# Patient Record
Sex: MALE | URBAN - METROPOLITAN AREA
[De-identification: names, ages, dates, MRNs, and addresses within clinical notes are randomized per-mention and may not be internally consistent; named-entity substitution may affect disease eponyms.]

---

## 2019-06-03 ENCOUNTER — APPOINTMENT (RX ONLY)
Dept: URBAN - METROPOLITAN AREA CLINIC 47 | Facility: CLINIC | Age: 59
Setting detail: DERMATOLOGY
End: 2019-06-03

## 2019-06-03 DIAGNOSIS — L29.89 OTHER PRURITUS: ICD-10-CM

## 2019-06-03 DIAGNOSIS — L85.3 XEROSIS CUTIS: ICD-10-CM

## 2019-06-03 DIAGNOSIS — L29.8 OTHER PRURITUS: ICD-10-CM

## 2019-06-03 PROBLEM — I82.409 ACUTE EMBOLISM AND THROMBOSIS OF UNSPECIFIED DEEP VEINS OF UNSPECIFIED LOWER EXTREMITY: Status: ACTIVE | Noted: 2019-06-03

## 2019-06-03 PROBLEM — H54.7 UNSPECIFIED VISUAL LOSS: Status: ACTIVE | Noted: 2019-06-03

## 2019-06-03 PROBLEM — M12.9 ARTHROPATHY, UNSPECIFIED: Status: ACTIVE | Noted: 2019-06-03

## 2019-06-03 PROBLEM — J44.9 CHRONIC OBSTRUCTIVE PULMONARY DISEASE, UNSPECIFIED: Status: ACTIVE | Noted: 2019-06-03

## 2019-06-03 PROBLEM — K21.9 GASTRO-ESOPHAGEAL REFLUX DISEASE WITHOUT ESOPHAGITIS: Status: ACTIVE | Noted: 2019-06-03

## 2019-06-03 PROBLEM — L20.84 INTRINSIC (ALLERGIC) ECZEMA: Status: ACTIVE | Noted: 2019-06-03

## 2019-06-03 PROBLEM — J30.1 ALLERGIC RHINITIS DUE TO POLLEN: Status: ACTIVE | Noted: 2019-06-03

## 2019-06-03 PROBLEM — I25.10 ATHEROSCLEROTIC HEART DISEASE OF NATIVE CORONARY ARTERY WITHOUT ANGINA PECTORIS: Status: ACTIVE | Noted: 2019-06-03

## 2019-06-03 PROBLEM — I10 ESSENTIAL (PRIMARY) HYPERTENSION: Status: ACTIVE | Noted: 2019-06-03

## 2019-06-03 PROBLEM — D68.8 OTHER SPECIFIED COAGULATION DEFECTS: Status: ACTIVE | Noted: 2019-06-03

## 2019-06-03 PROBLEM — L70.0 ACNE VULGARIS: Status: ACTIVE | Noted: 2019-06-03

## 2019-06-03 PROBLEM — J45.909 UNSPECIFIED ASTHMA, UNCOMPLICATED: Status: ACTIVE | Noted: 2019-06-03

## 2019-06-03 PROBLEM — E78.5 HYPERLIPIDEMIA, UNSPECIFIED: Status: ACTIVE | Noted: 2019-06-03

## 2019-06-03 PROBLEM — E13.9 OTHER SPECIFIED DIABETES MELLITUS WITHOUT COMPLICATIONS: Status: ACTIVE | Noted: 2019-06-03

## 2019-06-03 PROCEDURE — ? ADDITIONAL NOTES

## 2019-06-03 PROCEDURE — 99202 OFFICE O/P NEW SF 15 MIN: CPT

## 2019-06-03 PROCEDURE — ? PRESCRIPTION

## 2019-06-03 PROCEDURE — ? TREATMENT REGIMEN

## 2019-06-03 PROCEDURE — ? COUNSELING

## 2019-06-03 PROCEDURE — ? INVENTORY

## 2019-06-03 RX ORDER — AMMONIUM LACTATE 12 %
LOTION (GRAM) TOPICAL
Qty: 1 | Refills: 1 | Status: CANCELLED
Stop reason: CLARIF

## 2019-06-03 ASSESSMENT — LOCATION DETAILED DESCRIPTION DERM
LOCATION DETAILED: SUPERIOR THORACIC SPINE
LOCATION DETAILED: RIGHT MEDIAL UPPER BACK

## 2019-06-03 ASSESSMENT — LOCATION SIMPLE DESCRIPTION DERM
LOCATION SIMPLE: RIGHT UPPER BACK
LOCATION SIMPLE: UPPER BACK

## 2019-06-03 ASSESSMENT — LOCATION ZONE DERM: LOCATION ZONE: TRUNK

## 2019-06-03 NOTE — HPI: SKIN LESIONS
How Severe Is Your Skin Lesion?: mild
Is This A New Presentation, Or A Follow-Up?: Skin Lesions
Additional History: Treated with antibiotics.

## 2022-08-13 ENCOUNTER — INPATIENT (INPATIENT)
Facility: HOSPITAL | Age: 62
LOS: 1 days | Discharge: HOME | End: 2022-08-15
Attending: INTERNAL MEDICINE | Admitting: INTERNAL MEDICINE

## 2022-08-13 VITALS
HEART RATE: 59 BPM | OXYGEN SATURATION: 100 % | TEMPERATURE: 98 F | RESPIRATION RATE: 18 BRPM | DIASTOLIC BLOOD PRESSURE: 61 MMHG | SYSTOLIC BLOOD PRESSURE: 117 MMHG

## 2022-08-13 DIAGNOSIS — I20.9 ANGINA PECTORIS, UNSPECIFIED: ICD-10-CM

## 2022-08-13 DIAGNOSIS — Z95.1 PRESENCE OF AORTOCORONARY BYPASS GRAFT: Chronic | ICD-10-CM

## 2022-08-13 DIAGNOSIS — G43.909 MIGRAINE, UNSPECIFIED, NOT INTRACTABLE, WITHOUT STATUS MIGRAINOSUS: ICD-10-CM

## 2022-08-13 DIAGNOSIS — Z29.9 ENCOUNTER FOR PROPHYLACTIC MEASURES, UNSPECIFIED: ICD-10-CM

## 2022-08-13 DIAGNOSIS — E87.5 HYPERKALEMIA: ICD-10-CM

## 2022-08-13 DIAGNOSIS — N17.9 ACUTE KIDNEY FAILURE, UNSPECIFIED: ICD-10-CM

## 2022-08-13 DIAGNOSIS — R00.1 BRADYCARDIA, UNSPECIFIED: ICD-10-CM

## 2022-08-13 DIAGNOSIS — Z95.5 PRESENCE OF CORONARY ANGIOPLASTY IMPLANT AND GRAFT: Chronic | ICD-10-CM

## 2022-08-13 DIAGNOSIS — E11.9 TYPE 2 DIABETES MELLITUS WITHOUT COMPLICATIONS: ICD-10-CM

## 2022-08-13 DIAGNOSIS — R07.9 CHEST PAIN, UNSPECIFIED: ICD-10-CM

## 2022-08-13 LAB
ALBUMIN SERPL ELPH-MCNC: 4 G/DL — SIGNIFICANT CHANGE UP (ref 3.3–5)
ALP SERPL-CCNC: 49 U/L — SIGNIFICANT CHANGE UP (ref 40–120)
ALT FLD-CCNC: 9 U/L — SIGNIFICANT CHANGE UP (ref 4–41)
ANION GAP SERPL CALC-SCNC: 10 MMOL/L — SIGNIFICANT CHANGE UP (ref 7–14)
ANION GAP SERPL CALC-SCNC: 11 MMOL/L — SIGNIFICANT CHANGE UP (ref 7–14)
AST SERPL-CCNC: 11 U/L — SIGNIFICANT CHANGE UP (ref 4–40)
B PERT DNA SPEC QL NAA+PROBE: SIGNIFICANT CHANGE UP
B PERT+PARAPERT DNA PNL SPEC NAA+PROBE: SIGNIFICANT CHANGE UP
BASE EXCESS BLDV CALC-SCNC: -1.3 MMOL/L — SIGNIFICANT CHANGE UP (ref -2–3)
BILIRUB SERPL-MCNC: 0.5 MG/DL — SIGNIFICANT CHANGE UP (ref 0.2–1.2)
BLOOD GAS VENOUS COMPREHENSIVE RESULT: SIGNIFICANT CHANGE UP
BORDETELLA PARAPERTUSSIS (RAPRVP): SIGNIFICANT CHANGE UP
BUN SERPL-MCNC: 21 MG/DL — SIGNIFICANT CHANGE UP (ref 7–23)
BUN SERPL-MCNC: 22 MG/DL — SIGNIFICANT CHANGE UP (ref 7–23)
C PNEUM DNA SPEC QL NAA+PROBE: SIGNIFICANT CHANGE UP
CALCIUM SERPL-MCNC: 9 MG/DL — SIGNIFICANT CHANGE UP (ref 8.4–10.5)
CALCIUM SERPL-MCNC: 9.1 MG/DL — SIGNIFICANT CHANGE UP (ref 8.4–10.5)
CHLORIDE BLDV-SCNC: 105 MMOL/L — SIGNIFICANT CHANGE UP (ref 96–108)
CHLORIDE SERPL-SCNC: 104 MMOL/L — SIGNIFICANT CHANGE UP (ref 98–107)
CHLORIDE SERPL-SCNC: 105 MMOL/L — SIGNIFICANT CHANGE UP (ref 98–107)
CO2 BLDV-SCNC: 26.7 MMOL/L — HIGH (ref 22–26)
CO2 SERPL-SCNC: 22 MMOL/L — SIGNIFICANT CHANGE UP (ref 22–31)
CO2 SERPL-SCNC: 23 MMOL/L — SIGNIFICANT CHANGE UP (ref 22–31)
CREAT SERPL-MCNC: 1.29 MG/DL — SIGNIFICANT CHANGE UP (ref 0.5–1.3)
CREAT SERPL-MCNC: 1.58 MG/DL — HIGH (ref 0.5–1.3)
CREAT SERPL-MCNC: 1.61 MG/DL — HIGH (ref 0.5–1.3)
EGFR: 49 ML/MIN/1.73M2 — LOW
EGFR: 50 ML/MIN/1.73M2 — LOW
EGFR: 64 ML/MIN/1.73M2 — SIGNIFICANT CHANGE UP
FLUAV SUBTYP SPEC NAA+PROBE: SIGNIFICANT CHANGE UP
FLUBV RNA SPEC QL NAA+PROBE: SIGNIFICANT CHANGE UP
GAS PNL BLDV: 135 MMOL/L — LOW (ref 136–145)
GLUCOSE BLDC GLUCOMTR-MCNC: 111 MG/DL — HIGH (ref 70–99)
GLUCOSE BLDC GLUCOMTR-MCNC: 137 MG/DL — HIGH (ref 70–99)
GLUCOSE BLDC GLUCOMTR-MCNC: 86 MG/DL — SIGNIFICANT CHANGE UP (ref 70–99)
GLUCOSE BLDV-MCNC: 106 MG/DL — HIGH (ref 70–99)
GLUCOSE SERPL-MCNC: 114 MG/DL — HIGH (ref 70–99)
GLUCOSE SERPL-MCNC: 76 MG/DL — SIGNIFICANT CHANGE UP (ref 70–99)
HADV DNA SPEC QL NAA+PROBE: SIGNIFICANT CHANGE UP
HCO3 BLDV-SCNC: 25 MMOL/L — SIGNIFICANT CHANGE UP (ref 22–29)
HCOV 229E RNA SPEC QL NAA+PROBE: SIGNIFICANT CHANGE UP
HCOV HKU1 RNA SPEC QL NAA+PROBE: SIGNIFICANT CHANGE UP
HCOV NL63 RNA SPEC QL NAA+PROBE: SIGNIFICANT CHANGE UP
HCOV OC43 RNA SPEC QL NAA+PROBE: SIGNIFICANT CHANGE UP
HCT VFR BLD CALC: 34.8 % — LOW (ref 39–50)
HCT VFR BLDA CALC: 36 % — LOW (ref 39–51)
HGB BLD CALC-MCNC: 12.1 G/DL — LOW (ref 13–17)
HGB BLD-MCNC: 10.9 G/DL — LOW (ref 13–17)
HIV 1+2 AB+HIV1 P24 AG SERPL QL IA: SIGNIFICANT CHANGE UP
HMPV RNA SPEC QL NAA+PROBE: SIGNIFICANT CHANGE UP
HPIV1 RNA SPEC QL NAA+PROBE: SIGNIFICANT CHANGE UP
HPIV2 RNA SPEC QL NAA+PROBE: SIGNIFICANT CHANGE UP
HPIV3 RNA SPEC QL NAA+PROBE: SIGNIFICANT CHANGE UP
HPIV4 RNA SPEC QL NAA+PROBE: SIGNIFICANT CHANGE UP
LACTATE BLDV-MCNC: 1.2 MMOL/L — SIGNIFICANT CHANGE UP (ref 0.5–2)
M PNEUMO DNA SPEC QL NAA+PROBE: SIGNIFICANT CHANGE UP
MAGNESIUM SERPL-MCNC: 2 MG/DL — SIGNIFICANT CHANGE UP (ref 1.6–2.6)
MCHC RBC-ENTMCNC: 28.5 PG — SIGNIFICANT CHANGE UP (ref 27–34)
MCHC RBC-ENTMCNC: 31.3 GM/DL — LOW (ref 32–36)
MCV RBC AUTO: 91.1 FL — SIGNIFICANT CHANGE UP (ref 80–100)
NRBC # BLD: 0 /100 WBCS — SIGNIFICANT CHANGE UP
NRBC # FLD: 0 K/UL — SIGNIFICANT CHANGE UP
NT-PROBNP SERPL-SCNC: 1114 PG/ML — HIGH
PCO2 BLDV: 49 MMHG — SIGNIFICANT CHANGE UP (ref 42–55)
PH BLDV: 7.32 — SIGNIFICANT CHANGE UP (ref 7.32–7.43)
PHOSPHATE SERPL-MCNC: 3.6 MG/DL — SIGNIFICANT CHANGE UP (ref 2.5–4.5)
PLATELET # BLD AUTO: 207 K/UL — SIGNIFICANT CHANGE UP (ref 150–400)
PO2 BLDV: 40 MMHG — SIGNIFICANT CHANGE UP
POTASSIUM BLDV-SCNC: 4.7 MMOL/L — SIGNIFICANT CHANGE UP (ref 3.5–5.1)
POTASSIUM SERPL-MCNC: 4.6 MMOL/L — SIGNIFICANT CHANGE UP (ref 3.5–5.3)
POTASSIUM SERPL-MCNC: 5.6 MMOL/L — HIGH (ref 3.5–5.3)
POTASSIUM SERPL-SCNC: 4.6 MMOL/L — SIGNIFICANT CHANGE UP (ref 3.5–5.3)
POTASSIUM SERPL-SCNC: 5.6 MMOL/L — HIGH (ref 3.5–5.3)
PROT SERPL-MCNC: 6 G/DL — SIGNIFICANT CHANGE UP (ref 6–8.3)
RAPID RVP RESULT: SIGNIFICANT CHANGE UP
RBC # BLD: 3.82 M/UL — LOW (ref 4.2–5.8)
RBC # FLD: 13.9 % — SIGNIFICANT CHANGE UP (ref 10.3–14.5)
RSV RNA SPEC QL NAA+PROBE: SIGNIFICANT CHANGE UP
RV+EV RNA SPEC QL NAA+PROBE: SIGNIFICANT CHANGE UP
SAO2 % BLDV: 61.7 % — SIGNIFICANT CHANGE UP
SARS-COV-2 RNA SPEC QL NAA+PROBE: SIGNIFICANT CHANGE UP
SARS-COV-2 RNA SPEC QL NAA+PROBE: SIGNIFICANT CHANGE UP
SODIUM SERPL-SCNC: 137 MMOL/L — SIGNIFICANT CHANGE UP (ref 135–145)
SODIUM SERPL-SCNC: 138 MMOL/L — SIGNIFICANT CHANGE UP (ref 135–145)
TROPONIN T, HIGH SENSITIVITY RESULT: 14 NG/L — SIGNIFICANT CHANGE UP
TROPONIN T, HIGH SENSITIVITY RESULT: 17 NG/L — SIGNIFICANT CHANGE UP
WBC # BLD: 6.48 K/UL — SIGNIFICANT CHANGE UP (ref 3.8–10.5)
WBC # FLD AUTO: 6.48 K/UL — SIGNIFICANT CHANGE UP (ref 3.8–10.5)

## 2022-08-13 PROCEDURE — 71046 X-RAY EXAM CHEST 2 VIEWS: CPT | Mod: 26

## 2022-08-13 PROCEDURE — 93010 ELECTROCARDIOGRAM REPORT: CPT

## 2022-08-13 PROCEDURE — 70450 CT HEAD/BRAIN W/O DYE: CPT | Mod: 26

## 2022-08-13 PROCEDURE — 99285 EMERGENCY DEPT VISIT HI MDM: CPT | Mod: 25

## 2022-08-13 PROCEDURE — 99223 1ST HOSP IP/OBS HIGH 75: CPT

## 2022-08-13 RX ORDER — SODIUM CHLORIDE 9 MG/ML
1000 INJECTION, SOLUTION INTRAVENOUS
Refills: 0 | Status: DISCONTINUED | OUTPATIENT
Start: 2022-08-13 | End: 2022-08-15

## 2022-08-13 RX ORDER — GLUCAGON INJECTION, SOLUTION 0.5 MG/.1ML
1 INJECTION, SOLUTION SUBCUTANEOUS ONCE
Refills: 0 | Status: DISCONTINUED | OUTPATIENT
Start: 2022-08-13 | End: 2022-08-15

## 2022-08-13 RX ORDER — ASPIRIN/CALCIUM CARB/MAGNESIUM 324 MG
81 TABLET ORAL DAILY
Refills: 0 | Status: DISCONTINUED | OUTPATIENT
Start: 2022-08-13 | End: 2022-08-15

## 2022-08-13 RX ORDER — SODIUM ZIRCONIUM CYCLOSILICATE 10 G/10G
10 POWDER, FOR SUSPENSION ORAL ONCE
Refills: 0 | Status: COMPLETED | OUTPATIENT
Start: 2022-08-13 | End: 2022-08-13

## 2022-08-13 RX ORDER — SODIUM CHLORIDE 9 MG/ML
1000 INJECTION INTRAMUSCULAR; INTRAVENOUS; SUBCUTANEOUS ONCE
Refills: 0 | Status: COMPLETED | OUTPATIENT
Start: 2022-08-13 | End: 2022-08-13

## 2022-08-13 RX ORDER — RANOLAZINE 500 MG/1
500 TABLET, FILM COATED, EXTENDED RELEASE ORAL
Refills: 0 | Status: DISCONTINUED | OUTPATIENT
Start: 2022-08-13 | End: 2022-08-15

## 2022-08-13 RX ORDER — ASPIRIN/CALCIUM CARB/MAGNESIUM 324 MG
1 TABLET ORAL
Qty: 0 | Refills: 0 | DISCHARGE

## 2022-08-13 RX ORDER — PRASUGREL 5 MG/1
1 TABLET, FILM COATED ORAL
Qty: 0 | Refills: 0 | DISCHARGE

## 2022-08-13 RX ORDER — INSULIN LISPRO 100/ML
VIAL (ML) SUBCUTANEOUS
Refills: 0 | Status: DISCONTINUED | OUTPATIENT
Start: 2022-08-13 | End: 2022-08-15

## 2022-08-13 RX ORDER — DEXTROSE 50 % IN WATER 50 %
25 SYRINGE (ML) INTRAVENOUS ONCE
Refills: 0 | Status: DISCONTINUED | OUTPATIENT
Start: 2022-08-13 | End: 2022-08-15

## 2022-08-13 RX ORDER — FAMOTIDINE 10 MG/ML
20 INJECTION INTRAVENOUS DAILY
Refills: 0 | Status: DISCONTINUED | OUTPATIENT
Start: 2022-08-13 | End: 2022-08-15

## 2022-08-13 RX ORDER — HEPARIN SODIUM 5000 [USP'U]/ML
5000 INJECTION INTRAVENOUS; SUBCUTANEOUS EVERY 8 HOURS
Refills: 0 | Status: DISCONTINUED | OUTPATIENT
Start: 2022-08-13 | End: 2022-08-15

## 2022-08-13 RX ORDER — PANTOPRAZOLE SODIUM 20 MG/1
40 TABLET, DELAYED RELEASE ORAL
Refills: 0 | Status: DISCONTINUED | OUTPATIENT
Start: 2022-08-13 | End: 2022-08-13

## 2022-08-13 RX ORDER — PRASUGREL 5 MG/1
10 TABLET, FILM COATED ORAL DAILY
Refills: 0 | Status: DISCONTINUED | OUTPATIENT
Start: 2022-08-13 | End: 2022-08-15

## 2022-08-13 RX ORDER — DEXTROSE 50 % IN WATER 50 %
12.5 SYRINGE (ML) INTRAVENOUS ONCE
Refills: 0 | Status: DISCONTINUED | OUTPATIENT
Start: 2022-08-13 | End: 2022-08-15

## 2022-08-13 RX ORDER — OMEPRAZOLE 10 MG/1
1 CAPSULE, DELAYED RELEASE ORAL
Qty: 0 | Refills: 0 | DISCHARGE

## 2022-08-13 RX ORDER — ISOSORBIDE MONONITRATE 60 MG/1
30 TABLET, EXTENDED RELEASE ORAL DAILY
Refills: 0 | Status: DISCONTINUED | OUTPATIENT
Start: 2022-08-13 | End: 2022-08-15

## 2022-08-13 RX ORDER — INSULIN LISPRO 100/ML
VIAL (ML) SUBCUTANEOUS AT BEDTIME
Refills: 0 | Status: DISCONTINUED | OUTPATIENT
Start: 2022-08-13 | End: 2022-08-15

## 2022-08-13 RX ORDER — DEXTROSE 50 % IN WATER 50 %
15 SYRINGE (ML) INTRAVENOUS ONCE
Refills: 0 | Status: DISCONTINUED | OUTPATIENT
Start: 2022-08-13 | End: 2022-08-15

## 2022-08-13 RX ORDER — ACETAMINOPHEN 500 MG
650 TABLET ORAL EVERY 6 HOURS
Refills: 0 | Status: DISCONTINUED | OUTPATIENT
Start: 2022-08-13 | End: 2022-08-15

## 2022-08-13 RX ADMIN — FAMOTIDINE 20 MILLIGRAM(S): 10 INJECTION INTRAVENOUS at 17:18

## 2022-08-13 RX ADMIN — RANOLAZINE 500 MILLIGRAM(S): 500 TABLET, FILM COATED, EXTENDED RELEASE ORAL at 17:36

## 2022-08-13 RX ADMIN — HEPARIN SODIUM 5000 UNIT(S): 5000 INJECTION INTRAVENOUS; SUBCUTANEOUS at 23:40

## 2022-08-13 RX ADMIN — SODIUM CHLORIDE 1000 MILLILITER(S): 9 INJECTION INTRAMUSCULAR; INTRAVENOUS; SUBCUTANEOUS at 08:01

## 2022-08-13 RX ADMIN — SODIUM ZIRCONIUM CYCLOSILICATE 10 GRAM(S): 10 POWDER, FOR SUSPENSION ORAL at 15:24

## 2022-08-13 NOTE — ED ADULT NURSE REASSESSMENT NOTE - NS ED NURSE REASSESS COMMENT FT1
Received patient in bed AOX4. Patient came this morning for intermittent chest pain for the past 4-5 days.  Currently denied any chest pain except for headaches and dizziness if standing up.  Stated that he had a cardiac bypass and 5 stents we placed. Also taking blood thinner and blood pressure medications.  Patient is admitted and awaiting for bed.

## 2022-08-13 NOTE — ED PROVIDER NOTE - NSICDXPASTSURGICALHX_GEN_ALL_CORE_FT
PAST SURGICAL HISTORY:  H/O heart artery stent     S/P CABG (coronary artery bypass graft)     S/P CABG x 5

## 2022-08-13 NOTE — ED PROVIDER NOTE - NS ED ATTENDING STATEMENT MOD
This was a shared visit with the NATTY. I reviewed and verified the documentation and independently performed the documented:

## 2022-08-13 NOTE — H&P ADULT - PROBLEM SELECTOR PLAN 6
DVT ppx: heparin subq  Diet: DASH/consistent carb on home ertugliflozin-metformin. Was prescribed semiglutide but didn't take it   -hold home po DM med while inpatient  -start insulin correctional scale and FS TIDAC for now

## 2022-08-13 NOTE — H&P ADULT - NSHPREVIEWOFSYSTEMS_GEN_ALL_CORE
REVIEW OF SYSTEMS:    CONSTITUTIONAL: No weakness, fevers or chills  EYES: No visual change  ENT: No vertigo or throat pain   NECK: No pain or stiffness  RESPIRATORY: No cough, wheezing, hemoptysis; No shortness of breath  CARDIOVASCULAR: No chest pain or palpitations  GASTROINTESTINAL: No abdominal or epigastric pain. No nausea, vomiting, or hematemesis; No diarrhea or constipation. No melena or hematochezia.  GENITOURINARY: No dysuria, frequency or hematuria  NEUROLOGICAL: No numbness or weakness  SKIN: No itching, rashes  MSK: no joint pain, full ROM  PSYCH: no anxiety no depression CONSTITUTIONAL: No weakness, fevers or chills  EYES: No visual change  ENT: No vertigo or throat pain   NECK: No pain or stiffness  RESPIRATORY: No cough, wheezing, hemoptysis; No shortness of breath  CARDIOVASCULAR: No chest pain or palpitations  GASTROINTESTINAL: No abdominal or epigastric pain. No nausea, vomiting, or hematemesis; No diarrhea or constipation. No melena or hematochezia.  GENITOURINARY: No dysuria, frequency or hematuria  NEUROLOGICAL: No numbness or weakness  SKIN: No itching, rashes  MSK: no joint pain, full ROM  PSYCH: no anxiety no depression CONSTITUTIONAL: No weakness, fevers or chills  EYES: bilateral blurry vision, no flashing light.   ENT: No vertigo or throat pain   NECK: No pain or stiffness  RESPIRATORY: +cough, wheezing, hemoptysis; intermittent shortness of breath  CARDIOVASCULAR: +chest pain, no palpitations  GASTROINTESTINAL: No abdominal or epigastric pain. No nausea, vomiting, or hematemesis; No diarrhea or constipation. No melena or hematochezia.  GENITOURINARY: + dysuria, no frequency or hematuria  NEUROLOGICAL: No numbness or weakness. HA  SKIN: No itching, rashes  MSK: no joint pain, full ROM  PSYCH: no anxiety no depression

## 2022-08-13 NOTE — ED PROVIDER NOTE - PHYSICAL EXAMINATION
CONSTITUTIONAL: Well-appearing; well-nourished; in no apparent distress. Non-toxic appearing.   NEURO: Alert & oriented. Gait steady without assistance. Sensory and motor functions are grossly intact.  PSYCH: Mood appropriate. Thought processes intact.   NECK: Supple  CARD: bradycardia. regular rhythm, no murmurs  RESP: No accessory muscle use; breath sounds clear and equal bilaterally; no wheezes, rhonchi, or rales     ABD: Soft; non-distended; non-tender.   MUSCULOSKELETAL/EXTREMITIES: FROM in all four extremities; no extremity edema.  SKIN: Warm; dry; no apparent lesions or exudate

## 2022-08-13 NOTE — H&P ADULT - PROBLEM SELECTOR PLAN 4
noted for bradycardia on EKG and tele. Possibly 2/2 drug induced given patient recent started on clonidine  -per patient, this is new. No prior hx of bradycardia. EKG doesn't show any heart block or acute ischemia.  -will hold clonidine at this time  -f/u card rec  -tele monitor unclear baseline. Per patient, baseline cr not normal. So unclear if he has ckd and this is his baseline  -reports dysuria for the past week. Will obtain urinalysis  -check urine studies  -continue to monitor  -avoid nephrotoxic meds unclear baseline. Per patient, baseline cr not normal. So unclear if he has ckd and this is his baseline  -reports dysuria for the past week. Will obtain urinalysis  -check urine studies  -continue to monitor  -avoid nephrotoxic meds  -holding home PPI, switch to famotidine for now

## 2022-08-13 NOTE — ED PROVIDER NOTE - CLINICAL SUMMARY MEDICAL DECISION MAKING FREE TEXT BOX
62 yo M here for chest pain and sob. exam found pt bradycardic, and orthostatic positive. a+o x3. lung sound clear b/l. no pitting edema in LE.   orthostatic vs ACS vs CHF/cardiogenic shock.  start fluid.   labs including trop and pro-bnp, cxr.  pt's cardiologist is associated with [facility]. 62 yo M here for chest pain and sob. exam found pt bradycardic, and orthostatic positive. a+o x3. lung sound clear b/l. no pitting edema in LE.   medication induced hypotension and bradycardia vs ACS vs CHF/cardiogenic shock.  start fluid.   labs including trop and pro-bnp, cxr.  pt's cardiologist is associated with [facility]. 60 yo M here for chest pain and sob. exam found pt bradycardic, and orthostatic positive. a+o x3. lung sound clear b/l. no pitting edema in LE.   medication induced hypotension and bradycardia vs ACS vs heart failure/cardiogenic shock.  start fluid.   labs including trop and pro-bnp, cxr.  pt's cardiologist is associated with [facility].

## 2022-08-13 NOTE — H&P ADULT - NSHPSOCIALHISTORY_GEN_ALL_CORE
Denies tobacco, alcohol, drug use. Denies tobacco, alcohol, drug use. Lives alone. Works in a shelter

## 2022-08-13 NOTE — ED PROVIDER NOTE - NS ED ROS FT
ROS:  GENERAL: No fever, no chills  HEENT: no vision changes, no trouble swallowing, no trouble speaking  CARDIAC: see HPI  PULMONARY: no cough, no shortness of breath  GI: no abdominal pain, no nausea, no vomiting, no diarrhea, no constipation  : No dysuria, no frequency, no change in appearance or odor of urine  SKIN: no rashes  NEURO: no headache, no weakness, no dizziness  MSK: No joint pain  All other systems reviewed as negative. As per HPI

## 2022-08-13 NOTE — H&P ADULT - NSHPPHYSICALEXAM_GEN_ALL_CORE
VITALS: T(C): 36.7 (08-13-22 @ 11:00), Max: 36.7 (08-13-22 @ 06:50)  HR: 46 (08-13-22 @ 11:00) (46 - 59)  BP: 124/52 (08-13-22 @ 11:00) (100/51 - 124/52)  RR: 16 (08-13-22 @ 11:00) (16 - 18)  SpO2: 100% (08-13-22 @ 11:00) (100% - 100%)  GENERAL: NAD, well-developed, alert, resting comfortably  HEAD:  Atraumatic, Normocephalic  EYES: EOMI, PERRLA, conjunctiva and sclera clear  NECK: Supple, No JVD  CHEST/LUNG: Clear to auscultation bilaterally; No wheeze, ronchi or rales  HEART: Bradycardic, Regular rhythm; No murmurs, rubs, or gallops  ABDOMEN: Soft, Nontender, Nondistended; Bowel sounds present  EXTREMITIES:  2+ Peripheral Pulses, No clubbing, cyanosis, or edema  PSYCH: AAOx3, normal behavior, normal affect  NEUROLOGY: non-focal, normal strength, normal sensation  SKIN: No rashes or lesions

## 2022-08-13 NOTE — ED PROVIDER NOTE - PROGRESS NOTE DETAILS
pro-BNP 1114, trop 17, cxr showed clear lungs. pt admitted under Manuel Chacon for heart failure. will repeat trop.   K 5.6 but EKG showed no peaked wave. will continue monitor.

## 2022-08-13 NOTE — H&P ADULT - PROBLEM SELECTOR PLAN 3
unclear baseline. Per patient, baseline cr not normal. So unclear if he has ckd and this is his baseline  -reports dysuria for the past week. Will obtain urinalysis  -check urine studies  -continue to monitor  -avoid nephrotoxic meds headache and blurry vision can occur without chest pain as well. Photophobia. Suspect migraine headache.  -no gait abnormality and nonfocal neuro exam  -will obtain CTH to r/o any intracranial pathology  -tylenol prn for pain

## 2022-08-13 NOTE — H&P ADULT - PROBLEM SELECTOR PLAN 2
headache and blurry vision can occur without chest pain as well. Photophobia. Suspect migraine headache.  -no gait abnormality and nonfocal neuro exam  -will obtain CTH to r/o any intracranial pathology  -tylenol prn for pain likely in the setting of elevated Cr  -no peaked T wave on EKG  -will give lokelma x1  -repeat BMP in the early evening

## 2022-08-13 NOTE — H&P ADULT - PROBLEM SELECTOR PLAN 1
worsening pressuring, nonradiating chest pain x1 week with intermittent headache, blurry vision and sob  -EKG noted for sinus bradycardia, no acute ST elevation or T-wave inversion  -trop 14<--17  -c/w home ranolazine and isosorbide mononitrate w/ hold parameter  -f/u card recs  -tele monitoring  -will get echo  -hx of CAD, c/w home asa and prasugrel worsening pressuring, nonradiating chest pain x1 week with intermittent headache, blurry vision and sob  -EKG noted for sinus bradycardia, no acute ST elevation or T-wave inversion  -trop 14<--17  -pro-BNP elevated 1114, no orthopnea, leg swelling, currently not volume overload on exam  -c/w home ranolazine and isosorbide mononitrate w/ hold parameter  -f/u card recs  -tele monitoring  -will get echo  -hx of CAD, c/w home asa and prasugrel

## 2022-08-13 NOTE — H&P ADULT - ASSESSMENT
60 yo M with DM, CABG, s/p cardiac stents x5 presenting with worsening intermittent chest pain with associated headache, blurry vision and intermittent sob.

## 2022-08-13 NOTE — ED PROVIDER NOTE - OBJECTIVE STATEMENT
60 yo M with DM, CABG, s/p cardiac stents x5, c/o intermittent chest pain that is getting worse and more constant in the last 4-5 days. since this morning pt has been feeling dizzy while standing. took nitroglycerin, isosorbide, pt also states that there is sob sometimes. worse with walking and going up stairs. denies fever, chills, NVD. 62 yo M with DM, CABG, s/p cardiac stents x5, c/o intermittent chest pain that is getting worse and more constant in the last 4-5 days. since this morning pt has been feeling dizzy while standing. took nitroglycerin, isosorbide, pt also states that there is sob sometimes. worse with walking and going up stairs. denies fever, chills, NVD.  upon further interview, pt states that he was on metoprolol 25mg daily and last week his PCP Dr. Frias changed it into clonidine 0.1mg. reason unknown.

## 2022-08-13 NOTE — H&P ADULT - PROBLEM SELECTOR PLAN 5
on home ertugliflozin-metformin. Was prescribed semiglutide but didn't take it   -hold home po DM med while inpatient  -start insulin correctional scale and FS TIDAC for now noted for bradycardia on EKG and tele. Possibly 2/2 drug induced given patient recent started on clonidine  -per patient, this is new. No prior hx of bradycardia. EKG doesn't show any heart block or acute ischemia.  -will hold clonidine at this time  -f/u card rec  -tele monitor

## 2022-08-13 NOTE — ED ADULT NURSE NOTE - OBJECTIVE STATEMENT
Pt received to RM 29 AxOx4 complaining of Chest pain and SOB/ dyspnea on exertion worsening over the past few days. Chest pain is midsternal radiating up the back of neck. Placed on 2LNC for comfort, O2 sat was 100% RA. History of triple bypass in 2019, diabetes mellitus type 2, and hypertension. Denies nausea, vomiting, diarrhea,  symptoms. Orthostatics completed, positive.

## 2022-08-13 NOTE — H&P ADULT - HISTORY OF PRESENT ILLNESS
62 yo M with DM, CABG, s/p cardiac stents x5 presenting with worsening intermittent chest pain with associated headache and blurry vision (and sometimes sob) for the past week. Reports the pain in the chest is in the middle, pressure in nature, nonradiating. The episode often lasts about 5-10 mins. Taking his bp meds often helps the episode to go away. He checked his BP during the episode and SBP was in 150s. Reports getting up and walking around seem to trigger these episodes though they can happen when he is sitting down resting as well. Reports productive cough and dysuria for the past week. Denies sick contact, fever, chills, n/v/d. Of note, patient's pcp recently changed his medications recently (was on metoprolol 25mg daily and PCP changed it to clonidine 0.1mg) because of these episodes. He also was prescribed nitroglycerin and took it for the first time yesterday but feels that it didn't help his symptom. 60 yo M with DM, CABG, s/p cardiac stents x5 presenting with worsening intermittent chest pain with associated headache and blurry vision (and sometimes sob) for the past week. Reports the pain in the chest is in the middle, pressure in nature, nonradiating, nonreproducible on palpation. The episode often lasts about 5-10 mins. Taking his bp meds often helps the episode to go away. He checked his BP during the episode and SBP was in 150s. Reports getting up and walking around seem to trigger these episodes though they can happen when he is sitting down resting as well. The headache is at the back of his head and neck, light makes the headache worse. Reports productive cough and dysuria for the past week. Denies sick contact, fever, chills, n/v/d. Of note, patient's pcp recently changed his medications a few day ago (was on metoprolol 25mg daily and PCP changed it to clonidine 0.1mg) because of these episodes. He also was prescribed nitroglycerin and took it for the first time yesterday but feels that it didn't help his symptom. Denies leg swelling, weight gain, orthopnea.

## 2022-08-13 NOTE — ED PROVIDER NOTE - ATTENDING APP SHARED VISIT CONTRIBUTION OF CARE
I (Alverto) agree with above, I performed a history and physical. Counseled erin medical staff, physician assistant, and/or medical student on medical decision making as documented. Medical decisions and treatment interventions were made in real time during the patient encounter. Additionally and/or with the following exceptions: The patient presented to the ED with chest pain as above, non exertional, has a history of coronary artery bypass graft, and 5 stents, and dm. Of note was also orthostatically hypotensive. neuro intact, no pulse deficit, well perfused. ekg bradycardic but non ischemic, complete blood count within normal limits, complete metabolic panel within normal limits, first troponin indeterminant. Admitted for continued chest pain workup.     I reviewed monitor data at least 2 times 10 minutes or more apart during the patients stay.    The patient's condition was not amenable to outpatient treatment due either the lack of feasibility of outpatient care coordination, possibility for further decompensation with adverse outcome if discharge, or treatments and diagnostic  modalities only available during an inpatient hospitalization.

## 2022-08-14 LAB
A1C WITH ESTIMATED AVERAGE GLUCOSE RESULT: 5.9 % — HIGH (ref 4–5.6)
ANION GAP SERPL CALC-SCNC: 12 MMOL/L — SIGNIFICANT CHANGE UP (ref 7–14)
BUN SERPL-MCNC: 25 MG/DL — HIGH (ref 7–23)
CALCIUM SERPL-MCNC: 9.1 MG/DL — SIGNIFICANT CHANGE UP (ref 8.4–10.5)
CHLORIDE SERPL-SCNC: 102 MMOL/L — SIGNIFICANT CHANGE UP (ref 98–107)
CO2 SERPL-SCNC: 23 MMOL/L — SIGNIFICANT CHANGE UP (ref 22–31)
CREAT SERPL-MCNC: 1.51 MG/DL — HIGH (ref 0.5–1.3)
EGFR: 53 ML/MIN/1.73M2 — LOW
ESTIMATED AVERAGE GLUCOSE: 123 — SIGNIFICANT CHANGE UP
GLUCOSE BLDC GLUCOMTR-MCNC: 120 MG/DL — HIGH (ref 70–99)
GLUCOSE BLDC GLUCOMTR-MCNC: 120 MG/DL — HIGH (ref 70–99)
GLUCOSE BLDC GLUCOMTR-MCNC: 225 MG/DL — HIGH (ref 70–99)
GLUCOSE BLDC GLUCOMTR-MCNC: 232 MG/DL — HIGH (ref 70–99)
GLUCOSE BLDC GLUCOMTR-MCNC: 77 MG/DL — SIGNIFICANT CHANGE UP (ref 70–99)
GLUCOSE SERPL-MCNC: 111 MG/DL — HIGH (ref 70–99)
HCV AB S/CO SERPL IA: 0.08 S/CO — SIGNIFICANT CHANGE UP (ref 0–0.99)
HCV AB SERPL-IMP: SIGNIFICANT CHANGE UP
HIV 1+2 AB+HIV1 P24 AG SERPL QL IA: SIGNIFICANT CHANGE UP
MAGNESIUM SERPL-MCNC: 1.8 MG/DL — SIGNIFICANT CHANGE UP (ref 1.6–2.6)
PHOSPHATE SERPL-MCNC: 4 MG/DL — SIGNIFICANT CHANGE UP (ref 2.5–4.5)
POTASSIUM SERPL-MCNC: 4.6 MMOL/L — SIGNIFICANT CHANGE UP (ref 3.5–5.3)
POTASSIUM SERPL-SCNC: 4.6 MMOL/L — SIGNIFICANT CHANGE UP (ref 3.5–5.3)
SODIUM SERPL-SCNC: 137 MMOL/L — SIGNIFICANT CHANGE UP (ref 135–145)
T4 FREE SERPL-MCNC: 1.1 NG/DL — SIGNIFICANT CHANGE UP (ref 0.9–1.8)
TSH SERPL-MCNC: 1.15 UIU/ML — SIGNIFICANT CHANGE UP (ref 0.27–4.2)

## 2022-08-14 RX ADMIN — Medication 81 MILLIGRAM(S): at 12:03

## 2022-08-14 RX ADMIN — HEPARIN SODIUM 5000 UNIT(S): 5000 INJECTION INTRAVENOUS; SUBCUTANEOUS at 22:03

## 2022-08-14 RX ADMIN — ISOSORBIDE MONONITRATE 30 MILLIGRAM(S): 60 TABLET, EXTENDED RELEASE ORAL at 12:03

## 2022-08-14 RX ADMIN — RANOLAZINE 500 MILLIGRAM(S): 500 TABLET, FILM COATED, EXTENDED RELEASE ORAL at 17:55

## 2022-08-14 RX ADMIN — PRASUGREL 10 MILLIGRAM(S): 5 TABLET, FILM COATED ORAL at 12:03

## 2022-08-14 RX ADMIN — RANOLAZINE 500 MILLIGRAM(S): 500 TABLET, FILM COATED, EXTENDED RELEASE ORAL at 05:25

## 2022-08-14 RX ADMIN — FAMOTIDINE 20 MILLIGRAM(S): 10 INJECTION INTRAVENOUS at 12:03

## 2022-08-14 RX ADMIN — HEPARIN SODIUM 5000 UNIT(S): 5000 INJECTION INTRAVENOUS; SUBCUTANEOUS at 14:25

## 2022-08-14 RX ADMIN — Medication 2: at 08:44

## 2022-08-14 RX ADMIN — HEPARIN SODIUM 5000 UNIT(S): 5000 INJECTION INTRAVENOUS; SUBCUTANEOUS at 07:44

## 2022-08-14 NOTE — PROGRESS NOTE ADULT - SUBJECTIVE AND OBJECTIVE BOX
PATIENT SEEN AND EXAMINED ON :- 8/14/22  DATE OF SERVICE: 8/14/22            Interim events noted,Labs ,Radiological studies and Cardiology tests reviewed .       HOSPITAL COURSE: HPI:  60 yo M with DM, CABG, s/p cardiac stents x5 presenting with worsening intermittent chest pain with associated headache and blurry vision (and sometimes sob) for the past week. Reports the pain in the chest is in the middle, pressure in nature, nonradiating, nonreproducible on palpation. The episode often lasts about 5-10 mins. Taking his bp meds often helps the episode to go away. He checked his BP during the episode and SBP was in 150s. Reports getting up and walking around seem to trigger these episodes though they can happen when he is sitting down resting as well. The headache is at the back of his head and neck, light makes the headache worse. Reports productive cough and dysuria for the past week. Denies sick contact, fever, chills, n/v/d. Of note, patient's pcp recently changed his medications a few day ago (was on metoprolol 25mg daily and PCP changed it to clonidine 0.1mg) because of these episodes. He also was prescribed nitroglycerin and took it for the first time yesterday but feels that it didn't help his symptom. Denies leg swelling, weight gain, orthopnea. (13 Aug 2022 12:07)      INTERIM EVENTS:Patient seen at bedside ,interim events noted.      PMH -reviewed admission note, no change since admission  HEART FAILURE: Acute[ ]Chronic[ ] Systolic[ ] Diastolic[ ] Combined Systolic and Diastolic[ ]  CAD[ ] CABG[ ] PCI[ ]  DEVICES[ ] PPM[ ] ICD[ ] ILR[ ]  ATRIAL FIBRILLATION[ ] Paroxysmal[ ] Permanent[ ] CHADS2-[  ]  DISHA[ ] CKD1[ ] CKD2[ ] CKD3[ ] CKD4[ ] ESRD[ ]  COPD[ ] HTN[ ]   DM[ ] Type1[ ] Type 2[ ]   CVA[ ] Paresis[ ]    AMBULATION: Assisted[ ] Cane/walker[ ] Independent[ ]    MEDICATIONS  (STANDING):  aspirin enteric coated 81 milliGRAM(s) Oral daily  dextrose 5%. 1000 milliLiter(s) (50 mL/Hr) IV Continuous <Continuous>  dextrose 5%. 1000 milliLiter(s) (100 mL/Hr) IV Continuous <Continuous>  dextrose 50% Injectable 25 Gram(s) IV Push once  dextrose 50% Injectable 12.5 Gram(s) IV Push once  dextrose 50% Injectable 25 Gram(s) IV Push once  famotidine    Tablet 20 milliGRAM(s) Oral daily  glucagon  Injectable 1 milliGRAM(s) IntraMuscular once  heparin   Injectable 5000 Unit(s) SubCutaneous every 8 hours  insulin lispro (ADMELOG) corrective regimen sliding scale   SubCutaneous three times a day before meals  insulin lispro (ADMELOG) corrective regimen sliding scale   SubCutaneous at bedtime  isosorbide   mononitrate ER Tablet (IMDUR) 30 milliGRAM(s) Oral daily  prasugrel 10 milliGRAM(s) Oral daily  ranolazine 500 milliGRAM(s) Oral two times a day    MEDICATIONS  (PRN):  acetaminophen     Tablet .. 650 milliGRAM(s) Oral every 6 hours PRN Temp greater or equal to 38C (100.4F), Mild Pain (1 - 3)  dextrose Oral Gel 15 Gram(s) Oral once PRN Blood Glucose LESS THAN 70 milliGRAM(s)/deciliter            REVIEW OF SYSTEMS:  Constitutional: [ ] fever, [ ]weight loss,  [ ]fatigue [ ]weight gain  Eyes: [ ] visual changes  Respiratory: [ ]shortness of breath;  [ ] cough, [ ]wheezing, [ ]chills, [ ]hemoptysis  Cardiovascular: [ ] chest pain, [ ]palpitations, [ ]dizziness,  [ ]leg swelling[ ]orthopnea[ ]PND  Gastrointestinal: [ ] abdominal pain, [ ]nausea, [ ]vomiting,  [ ]diarrhea [ ]Constipation [ ]Melena  Genitourinary: [ ] dysuria, [ ] hematuria [ ]Sullivan  Neurologic: [ ] headaches [ ] tremors[ ]weakness [ ]Paralysis Right[ ] Left[ ]  Skin: [ ] itching, [ ]burning, [ ] rashes  Endocrine: [ ] heat or cold intolerance  Musculoskeletal: [ ] joint pain or swelling; [ ] muscle, back, or extremity pain  Psychiatric: [ ] depression, [ ]anxiety, [ ]mood swings, or [ ]difficulty sleeping  Hematologic: [ ] easy bruising, [ ] bleeding gums    [ ] All remaining systems negative except as per above.   [ ]Unable to obtain.  [x] No change in ROS since admission      Vital Signs Last 24 Hrs  T(C): 37.1 (14 Aug 2022 18:00), Max: 37.1 (14 Aug 2022 18:00)  T(F): 98.7 (14 Aug 2022 18:00), Max: 98.7 (14 Aug 2022 18:00)  HR: 65 (14 Aug 2022 18:00) (55 - 65)  BP: 169/65 (14 Aug 2022 18:00) (133/60 - 169/65)  BP(mean): --  RR: 18 (14 Aug 2022 18:00) (15 - 18)  SpO2: 100% (14 Aug 2022 18:00) (99% - 100%)    Parameters below as of 14 Aug 2022 18:00  Patient On (Oxygen Delivery Method): room air      I&O's Summary      PHYSICAL EXAM:  General: No acute distress BMI-  HEENT: EOMI, PERRL  Neck: Supple, [ ] JVD  Lungs: Equal air entry bilaterally; [ ] rales [ ] wheezing [ ] rhonchi  Heart: Regular rate and rhythm; [x ] murmur   2/6 [ x] systolic [ ] diastolic [ ] radiation[ ] rubs [ ]  gallops  Abdomen: Nontender, bowel sounds present  Extremities: No clubbing, cyanosis, [ ] edema [ ]Pulses  equal and intact  Nervous system:  Alert & Oriented X3, no focal deficits  Psychiatric: Normal affect  Skin: No rashes or lesions    LABS:  08-14    137  |  102  |  25<H>  ----------------------------<  111<H>  4.6   |  23  |  1.51<H>    Ca    9.1      14 Aug 2022 06:30  Phos  4.0     08-14  Mg     1.80     08-14    TPro  6.0  /  Alb  4.0  /  TBili  0.5  /  DBili  x   /  AST  11  /  ALT  9   /  AlkPhos  49  08-13    Creatinine Trend: 1.51<--, 1.58<--, 1.29<--, 1.61<--                        10.9   6.48  )-----------( 207      ( 13 Aug 2022 07:47 )             34.8         Serum Pro-Brain Natriuretic Peptide: 1114 pg/mL (08-13-22 @ 07:47)

## 2022-08-14 NOTE — PROGRESS NOTE ADULT - TIME BILLING
- Review of records, telemetry, vital signs and daily labs.   - General and cardiovascular physical examination.  - Generation of cardiovascular treatment plan.  - Coordination of care.      Patient was seen and examined by me on 8/14/22,interim events noted,labs and radiology studies reviewed.  Manuel Mai MD,FACC.  [address] [place], [place] [id number].  [phone]

## 2022-08-14 NOTE — PROGRESS NOTE ADULT - ASSESSMENT
62 yo M with DM, CABG, s/p cardiac stents x5 presenting with worsening intermittent chest pain with associated headache, blurry vision and intermittent sob.    # Chest pain.   -EKG noted for sinus bradycardia, no acute ST elevation or T-wave inversion  -trop 14<--17  -pro-BNP elevated 1114, no orthopnea, leg swelling, currently not volume overload on exam  -c/w home ranolazine and isosorbide mononitrate w/ hold parameter  -tele monitoring  -f/u  echo  -c/w home asa and prasugrel.  - plan cardiac cath in am      # Hyperkalemia.   -no peaked T wave on EKG  -will give lokelma x1  -repeat BMP in the early evening.    # Migraine headache.   - Suspect migraine headache.  -no gait abnormality and nonfocal neuro exam  -will obtain CTH to r/o any intracranial pathology  -tylenol prn for pain.      # DISHA (acute kidney injury).   -  baseline cr not normal. So unclear if he has ckd and this is his baseline  -reports dysuria for the past week. Will obtain urinalysis  -check urine studies  -continue to monitor  -avoid nephrotoxic meds  -holding home PPI, switch to famotidine for now.    # Bradycardia.   - Possibly 2/2 drug induced given patient recent started on clonidine  -per patient, this is new. No prior hx of bradycardia. EKG doesn't show any heart block or acute ischemia.  -will hold clonidine at this time  -f/u card rec  -tele monitor.    #Type 2 diabetes mellitus.   - on home ertugliflozin-metformin. Was prescribed semiglutide but didn't take it   -hold home po DM med while inpatient  -start insulin correctional scale and FS TIDAC for now.    #Prophylactic measure.   DVT ppx: heparin subq  Diet: DASH/consistent carb.

## 2022-08-15 VITALS
TEMPERATURE: 99 F | HEART RATE: 78 BPM | OXYGEN SATURATION: 99 % | RESPIRATION RATE: 18 BRPM | DIASTOLIC BLOOD PRESSURE: 62 MMHG | SYSTOLIC BLOOD PRESSURE: 133 MMHG

## 2022-08-15 LAB
ANION GAP SERPL CALC-SCNC: 13 MMOL/L — SIGNIFICANT CHANGE UP (ref 7–14)
APPEARANCE UR: CLEAR — SIGNIFICANT CHANGE UP
BILIRUB UR-MCNC: NEGATIVE — SIGNIFICANT CHANGE UP
BUN SERPL-MCNC: 32 MG/DL — HIGH (ref 7–23)
CALCIUM SERPL-MCNC: 9.2 MG/DL — SIGNIFICANT CHANGE UP (ref 8.4–10.5)
CHLORIDE SERPL-SCNC: 101 MMOL/L — SIGNIFICANT CHANGE UP (ref 98–107)
CO2 SERPL-SCNC: 21 MMOL/L — LOW (ref 22–31)
COLOR SPEC: SIGNIFICANT CHANGE UP
CREAT ?TM UR-MCNC: 56 MG/DL — SIGNIFICANT CHANGE UP
CREAT SERPL-MCNC: 1.57 MG/DL — HIGH (ref 0.5–1.3)
DIFF PNL FLD: NEGATIVE — SIGNIFICANT CHANGE UP
EGFR: 50 ML/MIN/1.73M2 — LOW
GLUCOSE BLDC GLUCOMTR-MCNC: 104 MG/DL — HIGH (ref 70–99)
GLUCOSE BLDC GLUCOMTR-MCNC: 141 MG/DL — HIGH (ref 70–99)
GLUCOSE BLDC GLUCOMTR-MCNC: 198 MG/DL — HIGH (ref 70–99)
GLUCOSE SERPL-MCNC: 106 MG/DL — HIGH (ref 70–99)
GLUCOSE UR QL: ABNORMAL
HCT VFR BLD CALC: 37.2 % — LOW (ref 39–50)
HGB BLD-MCNC: 12.2 G/DL — LOW (ref 13–17)
KETONES UR-MCNC: NEGATIVE — SIGNIFICANT CHANGE UP
LEUKOCYTE ESTERASE UR-ACNC: NEGATIVE — SIGNIFICANT CHANGE UP
MAGNESIUM SERPL-MCNC: 1.9 MG/DL — SIGNIFICANT CHANGE UP (ref 1.6–2.6)
MCHC RBC-ENTMCNC: 28.6 PG — SIGNIFICANT CHANGE UP (ref 27–34)
MCHC RBC-ENTMCNC: 32.8 GM/DL — SIGNIFICANT CHANGE UP (ref 32–36)
MCV RBC AUTO: 87.1 FL — SIGNIFICANT CHANGE UP (ref 80–100)
NITRITE UR-MCNC: NEGATIVE — SIGNIFICANT CHANGE UP
NRBC # BLD: 0 /100 WBCS — SIGNIFICANT CHANGE UP
NRBC # FLD: 0 K/UL — SIGNIFICANT CHANGE UP
OSMOLALITY UR: 467 MOSM/KG — SIGNIFICANT CHANGE UP (ref 50–1200)
PH UR: 6.5 — SIGNIFICANT CHANGE UP (ref 5–8)
PHOSPHATE SERPL-MCNC: 4.5 MG/DL — SIGNIFICANT CHANGE UP (ref 2.5–4.5)
PLATELET # BLD AUTO: 221 K/UL — SIGNIFICANT CHANGE UP (ref 150–400)
POTASSIUM SERPL-MCNC: 4.4 MMOL/L — SIGNIFICANT CHANGE UP (ref 3.5–5.3)
POTASSIUM SERPL-SCNC: 4.4 MMOL/L — SIGNIFICANT CHANGE UP (ref 3.5–5.3)
PROT ?TM UR-MCNC: 4 MG/DL — SIGNIFICANT CHANGE UP
PROT UR-MCNC: NEGATIVE — SIGNIFICANT CHANGE UP
PROT/CREAT UR-RTO: 0.1 RATIO — SIGNIFICANT CHANGE UP (ref 0–0.2)
RBC # BLD: 4.27 M/UL — SIGNIFICANT CHANGE UP (ref 4.2–5.8)
RBC # FLD: 13.7 % — SIGNIFICANT CHANGE UP (ref 10.3–14.5)
SODIUM SERPL-SCNC: 135 MMOL/L — SIGNIFICANT CHANGE UP (ref 135–145)
SODIUM UR-SCNC: 76 MMOL/L — SIGNIFICANT CHANGE UP
SP GR SPEC: 1.02 — SIGNIFICANT CHANGE UP (ref 1.01–1.05)
UROBILINOGEN FLD QL: SIGNIFICANT CHANGE UP
WBC # BLD: 5.92 K/UL — SIGNIFICANT CHANGE UP (ref 3.8–10.5)
WBC # FLD AUTO: 5.92 K/UL — SIGNIFICANT CHANGE UP (ref 3.8–10.5)

## 2022-08-15 PROCEDURE — 93459 L HRT ART/GRFT ANGIO: CPT | Mod: 26

## 2022-08-15 PROCEDURE — 93306 TTE W/DOPPLER COMPLETE: CPT | Mod: 26

## 2022-08-15 RX ORDER — RANOLAZINE 500 MG/1
1 TABLET, FILM COATED, EXTENDED RELEASE ORAL
Qty: 0 | Refills: 0 | DISCHARGE

## 2022-08-15 RX ORDER — SODIUM CHLORIDE 9 MG/ML
500 INJECTION INTRAMUSCULAR; INTRAVENOUS; SUBCUTANEOUS
Refills: 0 | Status: DISCONTINUED | OUTPATIENT
Start: 2022-08-15 | End: 2022-08-15

## 2022-08-15 RX ORDER — SODIUM CHLORIDE 9 MG/ML
250 INJECTION INTRAMUSCULAR; INTRAVENOUS; SUBCUTANEOUS ONCE
Refills: 0 | Status: COMPLETED | OUTPATIENT
Start: 2022-08-15 | End: 2022-08-15

## 2022-08-15 RX ORDER — ISOSORBIDE MONONITRATE 60 MG/1
1 TABLET, EXTENDED RELEASE ORAL
Qty: 0 | Refills: 0 | DISCHARGE

## 2022-08-15 RX ORDER — ERTUGLIFLOZIN AND METFORMIN HYDROCHLORIDE 7.5; 1 MG/1; MG/1
1 TABLET, FILM COATED ORAL
Qty: 0 | Refills: 0 | DISCHARGE

## 2022-08-15 RX ADMIN — SODIUM CHLORIDE 500 MILLILITER(S): 9 INJECTION INTRAMUSCULAR; INTRAVENOUS; SUBCUTANEOUS at 10:02

## 2022-08-15 RX ADMIN — Medication 81 MILLIGRAM(S): at 10:02

## 2022-08-15 RX ADMIN — RANOLAZINE 500 MILLIGRAM(S): 500 TABLET, FILM COATED, EXTENDED RELEASE ORAL at 05:43

## 2022-08-15 RX ADMIN — SODIUM CHLORIDE 75 MILLILITER(S): 9 INJECTION INTRAMUSCULAR; INTRAVENOUS; SUBCUTANEOUS at 09:57

## 2022-08-15 RX ADMIN — PRASUGREL 10 MILLIGRAM(S): 5 TABLET, FILM COATED ORAL at 10:02

## 2022-08-15 NOTE — DISCHARGE NOTE PROVIDER - NSFOLLOWUPCLINICS_GEN_ALL_ED_FT
[facility]  Cardiology  [address] [place] [id number]  Phone: [phone]  Fax:

## 2022-08-15 NOTE — DISCHARGE NOTE PROVIDER - HOSPITAL COURSE
8/15 CATH: LIMA to mLAD 60-70 anastamosis, SVG to OM patent, SVG to PRDA patent; mLAD 100, mOM2 100, pRCA 80 ISR; RFA access - recommended for medical therapy 60 yo M with DM, CABG, s/p cardiac stents x5 presenting with worsening intermittent chest pain with associated headache, blurry vision and intermittent sob, CTH negative, noted with acute sinusitis. Evaluated by cardiology, echo with normal LV/RV systolic function, now S/P diagnostic cath on 8/15, RFA access site C/D/I, to continue medical management. Patient remains hemodynamically stable and is medically cleared for discharge.     Hospital Course:  Chest pain.  -worsening pressuring, nonradiating chest pain x1 week with intermittent headache, blurry vision and sob  -EKG noted for sinus bradycardia, no acute ST elevation or T-wave inversion  -trop 14<--17  -pro-BNP elevated 1114, no orthopnea, leg swelling, currently not volume overload on exam; CXR clear   -c/w home ranolazine and isosorbide mononitrate w/ hold parameter  -cards following   -tele monitoring  -hx of CAD, c/w home asa and prasugrel.  -TTE- EF 56%,  Severely dilated left atrium, Mild diastolic dysfunction (Stage I), Normal LV/RV systolic function   -8/15 s/p CATH: LIMA to mLAD 60-70 anastamosis, SVG to OM patent, SVG to PRDA patent; mLAD 100, mOM2 100, pRCA 80 ISR; RFA access site stable - optimize medical therapy    Hyperkalemia.  -likely in the setting of elevated Cr  -no peaked T wave on EKG  -s/p lokelma x1, resolved      Migraine headache.  -headache and blurry vision can occur without chest pain as well. Photophobia. Suspect migraine headache 2/2 acute sinusitis   -no gait abnormality and nonfocal neuro exam  -CTH 8/13- no acute intracranial pathology, +Acute sinusitis    -tylenol prn for pain    DISHA (acute kidney injury).  -unclear baseline. Per patient, baseline cr not normal. So unclear if he has ckd and this is his baseline  -reports dysuria for the past week. UA neg  -continue to monitor   -avoid nephrotoxic meds.  -repeat BMP with PCP within 1 week     Bradycardia.  -noted for bradycardia on EKG and tele. Possibly 2/2 drug induced given patient recent started on clonidine  -per patient, this is new. No prior hx of bradycardia. EKG doesn't show any heart block or acute ischemia.  -will hold clonidine at this time- followup with outpt PCP/Cardiologist   -tele monitor.    Type 2 diabetes mellitus.  -A1C 5.9%   -on home ertugliflozin-metformin. Was prescribed semiglutide but didn't take it   -hold home po DM med while inpatient  -insulin correctional scale and FS TIDAC     Dispo: home     On 8/15/2022, case was discussed with Dr. Mai, patient is medically cleared and optimized for discharge today. All medications were reviewed with attending (continue current meds, Hold home clonidine, no pepcid, s/w home PPI).

## 2022-08-15 NOTE — DISCHARGE NOTE PROVIDER - PROVIDER TOKENS
FREE:[LAST:[Followup with your primary care doctor within 1-2 weeks],PHONE:[(   )    -],FAX:[(   )    -],ADDRESS:[Repeat bloodwork (CBC, BMP, LFTs)],FOLLOWUP:[1 week]]

## 2022-08-15 NOTE — DISCHARGE NOTE PROVIDER - NSDCMRMEDTOKEN_GEN_ALL_CORE_FT
aspirin 81 mg oral delayed release tablet: 1 tab(s) orally once a day  cloNIDine 0.1 mg oral tablet: 1 tab(s) orally 2 times a day  ertugliflozin-metformin 7.5 mg-1000 mg oral tablet: 1 tab(s) orally 2 times a day (with meals)  isosorbide mononitrate 30 mg oral tablet, extended release: 1 tab(s) orally once a day (in the morning)  omeprazole 40 mg oral delayed release capsule: 1 cap(s) orally once a day  prasugrel 10 mg oral tablet: 1 tab(s) orally once a day  ranolazine 500 mg oral tablet, extended release: 1 tab(s) orally 2 times a day   aspirin 81 mg oral delayed release tablet: 1 tab(s) orally once a day  ertugliflozin-metformin 7.5 mg-1000 mg oral tablet: 1 tab(s) orally 2 times a day (with meals)  followup with your doctor on further need for medication   isosorbide mononitrate 30 mg oral tablet, extended release: 1 tab(s) orally once a day (in the morning) HOLD FOR systolic blood pressure less than 100  omeprazole 40 mg oral delayed release capsule: 1 cap(s) orally once a day  prasugrel 10 mg oral tablet: 1 tab(s) orally once a day  ranolazine 500 mg oral tablet, extended release: 1 tab(s) orally 2 times a day  HOLD FOR systolic blood pressure less than 100

## 2022-08-15 NOTE — DISCHARGE NOTE NURSING/CASE MANAGEMENT/SOCIAL WORK - PATIENT PORTAL LINK FT
You can access the FollowMyHealth Patient Portal offered by [facility] by registering at the following website: [email or web address]. By joining [facility]’s FollowMyHealth portal, you will also be able to view your health information using other applications (apps) compatible with our system.

## 2022-08-15 NOTE — DISCHARGE NOTE PROVIDER - CARE PROVIDER_API CALL
Followup with your primary care doctor within 1-2 weeks,   Repeat bloodwork (CBC, BMP, LFTs)  Phone: (   )    -  Fax: (   )    -  Follow Up Time: 1 week

## 2022-08-15 NOTE — DISCHARGE NOTE PROVIDER - NSDCFUADDAPPT_GEN_ALL_CORE_FT
No driving x 24 hours.  No heavy lifting for one week. No strenuous activity x 2 weeks.  Monitor site of procedure and notify your doctor for any redness/swelling/discharge.  You may shower but no baths or swimming for one week or until skin is healed.  No driving x 24 hours.  No heavy lifting for one week. No strenuous activity x 2 weeks.  Monitor site of procedure and notify your doctor for any redness/swelling/discharge.  You may shower but no baths or swimming for one week or until skin is healed.     Followup with your PCP Dr. Rodriguez Fox, If you are in need of a general medicine physician and post-discharge medical follow-up for further care/recommendations you may contact the [facility] for an appointment [phone]    **Close followup with your Cardiologist Dr. Vicky Mcgee  [facility] [phone] (Monday-Friday 8am-5pm).

## 2022-08-15 NOTE — DISCHARGE NOTE NURSING/CASE MANAGEMENT/SOCIAL WORK - NSDCPEFALRISK_GEN_ALL_CORE
For information on Fall & Injury Prevention, visit: [email or web address] OR  [email or web address] OR  https://www.cdc.gov/steadi/patient.html

## 2022-08-15 NOTE — DISCHARGE NOTE NURSING/CASE MANAGEMENT/SOCIAL WORK - NSDCFUADDAPPT_GEN_ALL_CORE_FT
No driving x 24 hours.  No heavy lifting for one week. No strenuous activity x 2 weeks.  Monitor site of procedure and notify your doctor for any redness/swelling/discharge.  You may shower but no baths or swimming for one week or until skin is healed.

## 2022-08-15 NOTE — DISCHARGE NOTE PROVIDER - NSDCCPCAREPLAN_GEN_ALL_CORE_FT
PRINCIPAL DISCHARGE DIAGNOSIS  Diagnosis: Angina pectoris  Assessment and Plan of Treatment: You were evaluated by cardiology, Echocardiogram with Mild diastolic dysfunction (Stage I), Normal left/right ventricular systolic function. You had a diagnostic cardiac cath done on 8/15/22 via Right femoral artery, continue to optimize medical mangement and optimal blood pressure control.  Continue aspirin, prasugrel, ranolazine and isosorbide. HOLD Clonidine *  For the Next 12 hours: (You were given medicine during the procedure which may make you sleepy)  DO NOT: Operate machinery or use power tools.  DO NOT: Drink any alchoholic beverages- not even beer or wine.  DO NOT: Make important personal or business decisions.  -Activity: Rest today. NO strenuous activity, straining, heavy lifting, pushing or pulling for 3 days.   -Bandage: Keep bandage clean and dry. Remove after 24 hours.   -Bathing: You may shower tomorrow. No baths/sitting in pools for 7 days.  No driving x 24 hours.  No heavy lifting for one week. No strenuous activity x 2 weeks.  Monitor site of procedure and notify your doctor for any redness/swelling/discharge.  You may shower but no baths or swimming for one week or until skin is healed.   Diet: See instructions above. Drink at least 8 glasses of water today unless you are told otherwise.   Special Instructions: If the procedure was done in your leg ( groin), limit stair climbing for 48 hours.   If you have bleeding from the procedure site: Lie down and remove the bandage. Apply hard pressure and call your doctor. If bleeding and swelling cannot be controlled, call 911.  Call your doctor immediately if: 1) You have severe pain, swelling, or bruising at the procedure site. A small amount or soreness and bruising (black and blue) is normal. 2) Procedure site becomes very red or feels hot to touch. 3) Your hand becomes blue or feels cold to touch. 4)You feel sudden back or belly pain. 5)You develop fever.  If you are unable to contact your doctor, you may contact [facility] at [phone] (Monday-Friday 8am-5pm) and ask for cardiology fellow      SECONDARY DISCHARGE DIAGNOSES  Diagnosis: Migraine headache  Assessment and Plan of Treatment: Suspect migraine headache, clikely due to findings of acute sinusitis on your CT scan, stable.  May continue tylenol as needed. CAT scan head with no acute pathology.   *Close followup with your primcary care doctor if headaches persists or worsens**    Diagnosis: DISHA (acute kidney injury)  Assessment and Plan of Treatment: Noted with elevated kidney function and potassium, now stable. On discharge creatinine was 1.57.  In order to prevent further disease progression, continue to follow recommendations made by your primary provider/nephrologist. Continue a diet that is low in sodium and avoid foods that are concentrated in potassium and phosphorus. Continue your medications/supplementations as directed and avoid over-the-counter drugs that are harmful to kidneys, such as, Non-Steroidal Anti-Inflammatory Drugs (NSAIDs). Follow-up as outpatient to monitor your kidney function, as well as, vitamin D, Calcium, potassium, and phosphorus levels.  ****Repeat BMP with PCP within 1 week to monitor kidney function and electrolytes***    Diagnosis: Type 2 diabetes mellitus  Assessment and Plan of Treatment: A1C 5.9%, blood sugars well controlled. Continue your home medications as precribed    Diagnosis: Bradycardia  Assessment and Plan of Treatment: noted for bradycardia (decreased heart rate), Possibly due to clonidine.   HOLD CLONIDINE FOR NOW, please followup with outpatientt PCP/Cardiologist for further management

## 2025-03-31 ENCOUNTER — APPOINTMENT (OUTPATIENT)
Age: 65
End: 2025-03-31
Payer: MEDICAID

## 2025-03-31 PROCEDURE — D9310: CPT

## 2025-03-31 PROCEDURE — D0330 PANORAMIC RADIOGRAPHIC IMAGE: CPT
